# Patient Record
Sex: MALE
[De-identification: names, ages, dates, MRNs, and addresses within clinical notes are randomized per-mention and may not be internally consistent; named-entity substitution may affect disease eponyms.]

---

## 2018-04-17 ENCOUNTER — RX ONLY (OUTPATIENT)
Age: 58
Setting detail: RX ONLY
End: 2018-04-17

## 2020-02-26 ENCOUNTER — RX ONLY (OUTPATIENT)
Age: 60
Setting detail: RX ONLY
End: 2020-02-26

## 2020-12-22 ENCOUNTER — RX ONLY (OUTPATIENT)
Age: 60
Setting detail: RX ONLY
End: 2020-12-22

## 2020-12-22 ENCOUNTER — APPOINTMENT (RX ONLY)
Dept: URBAN - METROPOLITAN AREA CLINIC 56 | Facility: CLINIC | Age: 60
Setting detail: DERMATOLOGY
End: 2020-12-22

## 2020-12-22 PROBLEM — L44.9 PAPULOSQUAMOUS DISORDER, UNSPECIFIED: Status: ACTIVE | Noted: 2020-12-22

## 2020-12-22 PROCEDURE — ? RECOMMENDATIONS

## 2020-12-22 PROCEDURE — 99213 OFFICE O/P EST LOW 20 MIN: CPT

## 2020-12-22 RX ORDER — FLUOCINONIDE 0.5 MG/G
OINTMENT TOPICAL
Qty: 1 | Refills: 1 | Status: ERX

## 2020-12-22 NOTE — HPI: OTHER
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
Please Describe Your Condition:: Patient returns for his 3 month follow-up.  He has been using Vaseline on his hands and feet for the hyperkeratosis and fissuring overnight with cotton glove and sock occlusion.  He feels that has been helpful.  He has been using 40% urea cream to the hands and feet twice a day and feels that has helped some as well.  Previous treatment with calcipotriene 0.005% cream was ineffective.  He has not used any fluocinonide ointment for some time for the eruption on the lower legs.  He has just been applying Vaseline.  In general, he feels he has improved.  He still gets some fissuring of the heels, fingertips, and palms but this is much improved.  He has noticed that exposures to gasoline etc. do exacerbate his hand issue.  He tries to wear thin cotton gloves followed by protective vinyl/nitrile gloves as much as possible.

## 2020-12-22 NOTE — PROCEDURE: RECOMMENDATIONS
Recommendation Preamble: The following recommendations were made during the visit:
Detail Level: Zone
Recommendations (Free Text): Use a moisturizer such as Aquaphilic ointment, Cetaphil cream, or CeraVe cream to skin after all baths or showers and at least daily\\n\\nVaseline to hands and feet daily at bedtime with cotton glove/sock occlusion\\n\\n40% urea cream to hands and feet twice a day\\n\\nFluocinonide 0.05% ointment twice a day up to 15 days per month (avoid face and body folds) to eruption on body and extremities\\n\\nIf the problem worsens, consider referral to [facility] for phototherapy or possible treatment with Soriatane

## 2021-03-11 ENCOUNTER — APPOINTMENT (RX ONLY)
Dept: URBAN - METROPOLITAN AREA CLINIC 56 | Facility: CLINIC | Age: 61
Setting detail: DERMATOLOGY
End: 2021-03-11

## 2021-03-11 DIAGNOSIS — L44.0 PITYRIASIS RUBRA PILARIS: ICD-10-CM | Status: WORSENING

## 2021-03-11 PROBLEM — L30.9 DERMATITIS, UNSPECIFIED: Status: ACTIVE | Noted: 2021-03-11

## 2021-03-11 PROCEDURE — ? ORDER TESTS

## 2021-03-11 PROCEDURE — ? TREATMENT REGIMEN

## 2021-03-11 PROCEDURE — ? PRESCRIPTION

## 2021-03-11 PROCEDURE — ? COUNSELING

## 2021-03-11 PROCEDURE — 99214 OFFICE O/P EST MOD 30 MIN: CPT

## 2021-03-11 RX ORDER — ACITRETIN 10 MG/1
CAPSULE ORAL
Qty: 30 | Refills: 5 | Status: ERX

## 2021-03-11 NOTE — HPI: OTHER
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
Please Describe Your Condition:: Patient returns as a work in.  The skin disease has worsened on his hands and feet.  He is getting more fissures which are painful.  He has been using a moisturizing cream rather than Vaseline (because it is too messy) on his hands and feet with cotton glove and sock occlusion overnight.  In addition he has been using 40% urea cream twice a day as tolerated.  This just does not seem to be working as well as it had previously and his hands and feet becoming more uncomfortable.  It hurts when he walks on his feet.  It hurts when he uses hands to handle equipment, tools etc.  Calcipotriene cream and fluocinonide ointment were ineffective.  He wonders what else we can try.

## 2021-03-11 NOTE — PROCEDURE: TREATMENT REGIMEN
Plan: \\n1.  Because his hands and feet are getting much worse and are affecting his daily life secondary to the deep fissuring, more aggressive treatment is warranted.  He has failed calcipotriene cream and fluocinonide ointment.  With this in mind, I think acitretin is his next best option.  Potential side effects of this medication discussed.  All questions answered.\\n2.  Begin Acitretin 10 mg by mouth daily.\\n3.  Continue Eucerin cream to hands and feet daily at bedtime with sock and glove occlusion overnight.\\n4.  Continue 40% urea cream twice a day to hands and feet.\\n5.  Aquaphor to lips as often as needed.\\n6.  Saline nasal spray as needed.\\n7.  Refresh eyedrops as needed.
Detail Level: Simple

## 2021-04-08 ENCOUNTER — APPOINTMENT (RX ONLY)
Dept: URBAN - METROPOLITAN AREA CLINIC 56 | Facility: CLINIC | Age: 61
Setting detail: DERMATOLOGY
End: 2021-04-08

## 2021-04-08 DIAGNOSIS — L30.9 DERMATITIS, UNSPECIFIED: ICD-10-CM | Status: IMPROVED

## 2021-04-08 DIAGNOSIS — Z79.899 OTHER LONG TERM (CURRENT) DRUG THERAPY: ICD-10-CM

## 2021-04-08 PROCEDURE — ? ORDER TESTS

## 2021-04-08 PROCEDURE — 99214 OFFICE O/P EST MOD 30 MIN: CPT

## 2021-04-08 PROCEDURE — ? PRESCRIPTION MEDICATION MANAGEMENT

## 2021-04-08 NOTE — PROCEDURE: PRESCRIPTION MEDICATION MANAGEMENT
Plan: \\n1.  Continue Acetretin 10 mg by mouth daily.\\n2.  Continue Eucerin cream to hands and feet several times a day.  May use cotton glove and cotton sock occlusion overnight.\\n3.  Attempt to use 40% urea cream daily to hands and feet.  If this is too drying, try every other day.  If it is still too drying, discontinue the medication.\\n4.  Continue Aquaphor to lips as often as needed.\\n5.  Continue saline nasal spray as needed.\\n6.  Continue refresh eyedrops as needed.
Detail Level: Simple
Render In Strict Bullet Format?: No

## 2021-05-04 ENCOUNTER — RX ONLY (OUTPATIENT)
Age: 61
Setting detail: RX ONLY
End: 2021-05-04

## 2021-05-04 RX ORDER — UREA 40 %
CREAM (GRAM) TOPICAL
Qty: 1 | Refills: 5 | Status: CANCELLED
Stop reason: CLARIF

## 2021-05-06 ENCOUNTER — APPOINTMENT (RX ONLY)
Dept: URBAN - METROPOLITAN AREA CLINIC 56 | Facility: CLINIC | Age: 61
Setting detail: DERMATOLOGY
End: 2021-05-06

## 2021-05-06 DIAGNOSIS — Z71.89 OTHER SPECIFIED COUNSELING: ICD-10-CM

## 2021-05-06 DIAGNOSIS — L44.0 PITYRIASIS RUBRA PILARIS: ICD-10-CM | Status: IMPROVED

## 2021-05-06 PROBLEM — L30.9 DERMATITIS, UNSPECIFIED: Status: ACTIVE | Noted: 2021-05-06

## 2021-05-06 PROCEDURE — ? PRESCRIPTION MEDICATION MANAGEMENT

## 2021-05-06 PROCEDURE — 99214 OFFICE O/P EST MOD 30 MIN: CPT

## 2021-05-06 PROCEDURE — ? SUNSCREEN RECOMMENDATIONS

## 2021-05-06 NOTE — PROCEDURE: PRESCRIPTION MEDICATION MANAGEMENT
Detail Level: Zone
Plan: \\n1.  Continue Acetretin 10 mg by mouth daily.\\n2.  Continue Eucerin cream to hands and feet several times a day.  May use cotton glove and cotton sock occlusion overnight as well.\\n3.  Continue 40% urea cream to hands and feet daily.\\n4.  Continue Aquaphor to lips as often as needed. \\n5.  Continue saline nasal spray as needed.\\n6.  Continue refresh eyedrops as needed.\\n7.  Continue dietary measures as discussed previously to try and keep his triglyceride level down.  In addition continue rosuvastatin as prescribed by Dr. Connolly.  Patient states he has another follow-up appointment with him next month.\\n8.  Follow-up in 2 months.  I do plan on getting repeat laboratory work at that time if Dr. Connolly has not.
Render In Strict Bullet Format?: No

## 2021-06-29 ENCOUNTER — APPOINTMENT (RX ONLY)
Dept: URBAN - METROPOLITAN AREA CLINIC 56 | Facility: CLINIC | Age: 61
Setting detail: DERMATOLOGY
End: 2021-06-29

## 2021-06-29 DIAGNOSIS — L30.9 DERMATITIS, UNSPECIFIED: ICD-10-CM | Status: IMPROVED

## 2021-06-29 PROCEDURE — 99213 OFFICE O/P EST LOW 20 MIN: CPT

## 2021-06-29 PROCEDURE — ? PRESCRIPTION MEDICATION MANAGEMENT

## 2021-06-29 NOTE — PROCEDURE: PRESCRIPTION MEDICATION MANAGEMENT
Render In Strict Bullet Format?: No
Detail Level: Zone
Plan: \\n1.  Continue Acetretin 10 mg by mouth daily.\\n2.  Continue Eucerin cream to hands and feet several times a day.  May use cotton glove and cotton sock occlusion overnight as well.\\n3.  Continue 40% urea cream to hands and feet daily.\\n4.  Continue Aquaphor, saline nasal spray, refresh eyedrops as before.\\n5.  Patient will have a copy of his most recent laboratory work forwarded to me.\\n6.  Follow-up in 2 months.

## 2021-08-24 ENCOUNTER — APPOINTMENT (RX ONLY)
Dept: URBAN - METROPOLITAN AREA CLINIC 56 | Facility: CLINIC | Age: 61
Setting detail: DERMATOLOGY
End: 2021-08-24

## 2021-08-24 DIAGNOSIS — L44.0 PITYRIASIS RUBRA PILARIS: ICD-10-CM | Status: WORSENING

## 2021-08-24 PROBLEM — L30.9 DERMATITIS, UNSPECIFIED: Status: ACTIVE | Noted: 2021-08-24

## 2021-08-24 PROCEDURE — ? PRESCRIPTION SAMPLES PROVIDED

## 2021-08-24 PROCEDURE — 99213 OFFICE O/P EST LOW 20 MIN: CPT

## 2021-08-24 PROCEDURE — ? TREATMENT REGIMEN

## 2021-08-24 NOTE — PROCEDURE: TREATMENT REGIMEN
Detail Level: Simple
Plan: \\n1.  Continue Acetretin 10 mg by mouth daily.\\n2.  Discontinue 40% urea cream.  Patient actually feels this medication makes his hands and feet worse.\\n3.  Continue Eucerin creams to hands and feet several times a day.\\n4.  Begin Duobrii to affected areas on hands and feet daily at bedtime when necessary.\\n5.  Encourage patient to try and protect his hands appropriately with thin cotton gloves first followed by protective glove.  He may not be able to do this because he cannot perform precision work with his hands then.\\n6.  Follow-up in 2 months.  Plan repeat laboratory work at that time if patient has not gotten any laboratory work in the interim.

## 2021-10-26 ENCOUNTER — APPOINTMENT (RX ONLY)
Dept: URBAN - METROPOLITAN AREA CLINIC 56 | Facility: CLINIC | Age: 61
Setting detail: DERMATOLOGY
End: 2021-10-26

## 2021-10-26 ENCOUNTER — RX ONLY (OUTPATIENT)
Age: 61
Setting detail: RX ONLY
End: 2021-10-26

## 2021-10-26 DIAGNOSIS — L30.9 DERMATITIS, UNSPECIFIED: ICD-10-CM | Status: IMPROVED

## 2021-10-26 PROCEDURE — ? ORDER TESTS

## 2021-10-26 PROCEDURE — 99213 OFFICE O/P EST LOW 20 MIN: CPT

## 2021-10-26 PROCEDURE — ? TREATMENT REGIMEN

## 2021-10-26 RX ORDER — HALOBETASOL PROPIONATE AND TAZAROTENE .1; .45 MG/G; MG/G
LOTION TOPICAL
Qty: 100 | Refills: 1 | Status: ERX

## 2021-10-26 NOTE — PROCEDURE: TREATMENT REGIMEN
Plan: \\n1.  Continue Acitretin 10 mg by mouth daily.\\n2.  Continue Duobrii to affected areas on hands and feet daily at bedtime when necessary.\\n3.  Continue Eucerin creams to hands and feet several times daily.\\n4.  Continue thin cotton gloves followed by protective gloves as often as possible while performing work.  He does state this is very difficult because he cannot do precision work that is needed.\\n5.  Fasting lipid panel.  I will contact the patient only if there is any concerning values.  Dr. Connolly is following this.  I will have a copy of the lab results forwarded to him as well.
Detail Level: Simple

## 2022-02-15 ENCOUNTER — APPOINTMENT (RX ONLY)
Dept: URBAN - METROPOLITAN AREA CLINIC 56 | Facility: CLINIC | Age: 62
Setting detail: DERMATOLOGY
End: 2022-02-15

## 2022-02-15 DIAGNOSIS — L30.9 DERMATITIS, UNSPECIFIED: ICD-10-CM | Status: STABLE

## 2022-02-15 PROCEDURE — ? TREATMENT REGIMEN

## 2022-02-15 PROCEDURE — 99213 OFFICE O/P EST LOW 20 MIN: CPT

## 2022-02-15 NOTE — PROCEDURE: TREATMENT REGIMEN
Detail Level: Detailed
Plan: \\n1.  I encouraged patient to phone Dr Connolly's office again today for consideration of fenofibrate (or some other medication) to lower his triglyceride level.  I think his hands and feet will get much worse if we have to discontinue the acetretin.  I asked him to call me after he speaks to them today.\\n2.  If his physician decides to start him on another medication to lower his triglyceride level, I will have him continue acetretin 10 mg p.o. daily, Duobrii to affected areas on hands and feet nightly as needed, Eucerin cream to hands and feet as often as possible but at least several times a day, and appropriate hand protection with thin cotton gauze followed by protective gloves when working.  If his physician decides not to start him on another medication to lower his triglyceride level, I instructed patient to discontinue the acetretin and phone me.  I would probably need to refer him to the [facility] for hand and foot phototherapy.\\n3.  Follow-up in 4 months, sooner if needed.

## 2022-06-08 ENCOUNTER — APPOINTMENT (RX ONLY)
Dept: URBAN - METROPOLITAN AREA CLINIC 56 | Facility: CLINIC | Age: 62
Setting detail: DERMATOLOGY
End: 2022-06-08

## 2022-06-08 DIAGNOSIS — L30.9 DERMATITIS, UNSPECIFIED: ICD-10-CM | Status: WELL CONTROLLED

## 2022-06-08 PROCEDURE — 99213 OFFICE O/P EST LOW 20 MIN: CPT

## 2022-06-08 PROCEDURE — ? TREATMENT REGIMEN

## 2022-06-08 PROCEDURE — ? PRESCRIPTION

## 2022-06-08 RX ORDER — HALOBETASOL PROPIONATE AND TAZAROTENE .1; .45 MG/G; MG/G
LOTION TOPICAL
Qty: 100 | Refills: 5 | Status: ERX | COMMUNITY
Start: 2022-06-08

## 2022-06-08 RX ADMIN — HALOBETASOL PROPIONATE AND TAZAROTENE: .1; .45 LOTION TOPICAL at 00:00

## 2022-06-08 NOTE — PROCEDURE: TREATMENT REGIMEN
Detail Level: Simple
Plan: \\n1.  Continue acitretin 10 mg p.o. daily.\\n2.  Continue Duobrii daily as needed flaring of the dermatitis on the hands and feet.\\n3.  Continue Eucerin cream to hands and feet several times daily\\n4.  Continue hand protection as before.\\n5.  Continue fenofibrate at as per his internist for the hypertriglyceridemia

## 2022-09-28 ENCOUNTER — APPOINTMENT (RX ONLY)
Dept: URBAN - METROPOLITAN AREA CLINIC 56 | Facility: CLINIC | Age: 62
Setting detail: DERMATOLOGY
End: 2022-09-28

## 2022-09-28 DIAGNOSIS — L30.9 DERMATITIS, UNSPECIFIED: ICD-10-CM | Status: WELL CONTROLLED

## 2022-09-28 PROCEDURE — ? TREATMENT REGIMEN

## 2022-09-28 PROCEDURE — 99213 OFFICE O/P EST LOW 20 MIN: CPT

## 2022-09-28 NOTE — PROCEDURE: TREATMENT REGIMEN
Detail Level: Zone
Plan: \\n1.  Continue acitretin 10 mg p.o. daily.\\n2.  Encouraged patient to use Duobrii daily as needed flaring of the dermatitis on his hands and feet.\\n3.  Encouraged patient to use Eucerin cream to his hands and feet several times per day regularly.\\n4.  Continue hand protection as before.\\n5.  Continue fenofibrate as per his internist for the hypertriglyceridemia.\\n6.  Because patient had laboratory work done within the past month that is internist, I will not repeat that today.  I will have my  contact patient's primary care physician's office to get a copy of his most recent laboratory work.  If there are any concerning values, I will contact the patient.

## 2022-09-29 ENCOUNTER — APPOINTMENT (RX ONLY)
Dept: URBAN - METROPOLITAN AREA CLINIC 56 | Facility: CLINIC | Age: 62
Setting detail: DERMATOLOGY
End: 2022-09-29

## 2022-09-29 DIAGNOSIS — E78.1 PURE HYPERGLYCERIDEMIA: ICD-10-CM

## 2022-09-29 PROCEDURE — ? ORDER TESTS

## 2022-09-29 NOTE — PROCEDURE: ORDER TESTS
Performing Laboratory: 0
Bill For Surgical Tray: no
Billing Type: Third-Party Bill
Expected Date Of Service: 09/29/2022

## 2023-03-29 ENCOUNTER — APPOINTMENT (RX ONLY)
Dept: URBAN - METROPOLITAN AREA CLINIC 56 | Facility: CLINIC | Age: 63
Setting detail: DERMATOLOGY
End: 2023-03-29

## 2023-03-29 DIAGNOSIS — L29.89 OTHER PRURITUS: ICD-10-CM | Status: INADEQUATELY CONTROLLED

## 2023-03-29 DIAGNOSIS — L30.9 DERMATITIS, UNSPECIFIED: ICD-10-CM | Status: INADEQUATELY CONTROLLED

## 2023-03-29 DIAGNOSIS — L29.8 OTHER PRURITUS: ICD-10-CM | Status: INADEQUATELY CONTROLLED

## 2023-03-29 DIAGNOSIS — Z71.89 OTHER SPECIFIED COUNSELING: ICD-10-CM

## 2023-03-29 PROCEDURE — ? TREATMENT REGIMEN

## 2023-03-29 PROCEDURE — 99214 OFFICE O/P EST MOD 30 MIN: CPT

## 2023-03-29 PROCEDURE — ? SUNSCREEN RECOMMENDATIONS

## 2023-03-29 PROCEDURE — ? ORDER TESTS

## 2023-03-29 ASSESSMENT — LOCATION ZONE DERM: LOCATION ZONE: TRUNK

## 2023-03-29 ASSESSMENT — LOCATION DETAILED DESCRIPTION DERM: LOCATION DETAILED: RIGHT SUPERIOR MEDIAL UPPER BACK

## 2023-03-29 ASSESSMENT — LOCATION SIMPLE DESCRIPTION DERM: LOCATION SIMPLE: RIGHT UPPER BACK

## 2023-03-29 NOTE — PROCEDURE: TREATMENT REGIMEN
Plan: 1.  Continue acitretin 10 mg p.o. daily.\\n2.  Hepatic function panel, creatinine, fasting lipid panel.  I will call the patient with the results.\\n3.  Discontinue Duobrii.\\n4.  Begin Vtama to affected areas on hands, feet, and legs daily.  Samples given.\\n5.  Continue Eucerin cream to hands and feet at least daily and continue hand protection as before.\\n6.  Continue fenofibrate as per his internist for the hypertriglyceridemia.
Detail Level: Detailed
Plan: 1.  Take cooler baths and showers.\\n2.  Moisturize skin after all baths and showers.  Samples of Eucerin lotion given as well as Neutrogena hydrogel cream.\\n3.  Sarna lotion as often as needed.\\n4.  Because this itching seems to have started around the time he started amlodipine, I suggested he take a 2-week holiday off the amlodipine.  If the pruritus resolves, patient will call his internist to look into an alternative medication for the amlodipine.  If he notes no change in the pruritus after 2 weeks, he may restart the amlodipine.
Detail Level: Zone

## 2023-03-29 NOTE — PROCEDURE: ORDER TESTS
Expected Date Of Service: 03/29/2023
Performing Laboratory: 0
Bill For Surgical Tray: no
Billing Type: Third-Party Bill

## 2023-05-18 ENCOUNTER — RX ONLY (OUTPATIENT)
Age: 63
Setting detail: RX ONLY
End: 2023-05-18

## 2023-05-18 RX ORDER — TAPINAROF 10 MG/1000MG
CREAM TOPICAL
Qty: 60 | Refills: 5 | Status: ERX | COMMUNITY
Start: 2023-05-18

## 2023-11-21 ENCOUNTER — APPOINTMENT (RX ONLY)
Dept: URBAN - METROPOLITAN AREA CLINIC 56 | Facility: CLINIC | Age: 63
Setting detail: DERMATOLOGY
End: 2023-11-21

## 2023-11-21 DIAGNOSIS — L57.0 ACTINIC KERATOSIS: ICD-10-CM

## 2023-11-21 DIAGNOSIS — L30.9 DERMATITIS, UNSPECIFIED: ICD-10-CM | Status: WELL CONTROLLED

## 2023-11-21 DIAGNOSIS — B35.3 TINEA PEDIS: ICD-10-CM

## 2023-11-21 DIAGNOSIS — Z71.89 OTHER SPECIFIED COUNSELING: ICD-10-CM

## 2023-11-21 DIAGNOSIS — L29.8 OTHER PRURITUS: ICD-10-CM

## 2023-11-21 DIAGNOSIS — L29.89 OTHER PRURITUS: ICD-10-CM

## 2023-11-21 PROCEDURE — ? SUNSCREEN RECOMMENDATIONS

## 2023-11-21 PROCEDURE — ? TREATMENT REGIMEN

## 2023-11-21 PROCEDURE — ? ORDER TESTS

## 2023-11-21 PROCEDURE — ? LIQUID NITROGEN

## 2023-11-21 PROCEDURE — 17000 DESTRUCT PREMALG LESION: CPT

## 2023-11-21 PROCEDURE — 99213 OFFICE O/P EST LOW 20 MIN: CPT | Mod: 25

## 2023-11-21 ASSESSMENT — LOCATION SIMPLE DESCRIPTION DERM: LOCATION SIMPLE: RIGHT CHEEK

## 2023-11-21 ASSESSMENT — LOCATION DETAILED DESCRIPTION DERM: LOCATION DETAILED: RIGHT SUPERIOR LATERAL MALAR CHEEK

## 2023-11-21 ASSESSMENT — LOCATION ZONE DERM: LOCATION ZONE: FACE

## 2023-11-21 ASSESSMENT — ITCH NUMERIC RATING SCALE: HOW SEVERE IS YOUR ITCHING?: 2

## 2023-11-21 NOTE — PROCEDURE: LIQUID NITROGEN
Application Tool (Optional): Cotton Tipped Applicator
Render Note In Bullet Format When Appropriate: No
Post-Care Instructions: I reviewed with the patient in detail post-care instructions. Patient is to wear sunprotection, and avoid picking at any of the treated lesions. Pt may apply Vaseline to crusted or scabbing areas.
Render Post-Care Instructions In Note?: yes
Duration Of Freeze Thaw-Cycle (Seconds): 10
Consent: The patient's consent was obtained including but not limited to risks of crusting, scabbing, blistering, scarring, darker or lighter pigmentary change, recurrence, incomplete removal and infection.
Detail Level: Detailed
Number Of Freeze-Thaw Cycles: 1 freeze-thaw cycle

## 2023-11-21 NOTE — PROCEDURE: TREATMENT REGIMEN
Plan: 1.  Hepatic function panel and fasting lipid panel\\n2.   Continue acitretin 10 mg p.o. daily.\\n3.  Continue Vtama to affected areas daily.\\n4.  Continue Eucerin cream to hands and feet several times daily and continue hand protection as before.\\n5.  Continue fenofibrate as per his internist for his hypertriglyceridemia
Detail Level: Zone
Plan: Because patient had no pruritus at all over the summertime and fall, this obviously is not a drug reaction to his fenofibrate.  All his oral medications are unchanged and the doses have not changed.  With this in mind, it sounds like this is more \"winter itch secondary to xerosis.  I encouraged the patient to take cooler showers and moisturize his entire skin after all baths and showers, at least daily, and more often if needed.  He may also use a mentholated lotion as needed.
Plan: 1.  Lamisil cream twice daily for 1 month.  Repeat as needed.\\n2.  Zeasorb-AF powder to feet daily long-term

## 2023-11-21 NOTE — PROCEDURE: MIPS QUALITY
Quality 431: Preventive Care And Screening: Unhealthy Alcohol Use - Screening: Patient not identified as an unhealthy alcohol user when screened for unhealthy alcohol use using a systematic screening method
Quality 486: Dermatitis - Improvement In Patient-Reported Itch Severity: Itch severity assessment score is reduced by 2 or more points from the initial (index) assessment score to the follow-up visit score
Detail Level: Detailed
Quality 226: Preventive Care And Screening: Tobacco Use: Screening And Cessation Intervention: Patient screened for tobacco use and is an ex/non-smoker

## 2023-11-21 NOTE — PROCEDURE: ORDER TESTS
Bill For Surgical Tray: no
Performing Laboratory: 0
Billing Type: Third-Party Bill
Expected Date Of Service: 11/21/2023

## 2023-11-28 ENCOUNTER — RX ONLY (OUTPATIENT)
Age: 63
Setting detail: RX ONLY
End: 2023-11-28

## 2023-11-28 RX ORDER — ACITRETIN 10 MG/1
CAPSULE ORAL
Qty: 30 | Refills: 5 | Status: ERX

## 2024-05-09 ENCOUNTER — APPOINTMENT (RX ONLY)
Dept: URBAN - METROPOLITAN AREA CLINIC 56 | Facility: CLINIC | Age: 64
Setting detail: DERMATOLOGY
End: 2024-05-09

## 2024-05-09 DIAGNOSIS — L57.0 ACTINIC KERATOSIS: ICD-10-CM

## 2024-05-09 DIAGNOSIS — Z71.89 OTHER SPECIFIED COUNSELING: ICD-10-CM

## 2024-05-09 DIAGNOSIS — L30.9 DERMATITIS, UNSPECIFIED: ICD-10-CM | Status: STABLE

## 2024-05-09 PROCEDURE — ? TREATMENT REGIMEN

## 2024-05-09 PROCEDURE — ? ORDER TESTS

## 2024-05-09 PROCEDURE — ? SUNSCREEN RECOMMENDATIONS

## 2024-05-09 PROCEDURE — 99213 OFFICE O/P EST LOW 20 MIN: CPT | Mod: 25

## 2024-05-09 PROCEDURE — ? LIQUID NITROGEN

## 2024-05-09 PROCEDURE — ? COUNSELING

## 2024-05-09 PROCEDURE — 17000 DESTRUCT PREMALG LESION: CPT

## 2024-05-09 ASSESSMENT — LOCATION ZONE DERM: LOCATION ZONE: NOSE

## 2024-05-09 ASSESSMENT — ITCH NUMERIC RATING SCALE: HOW SEVERE IS YOUR ITCHING?: 0

## 2024-05-09 ASSESSMENT — LOCATION DETAILED DESCRIPTION DERM: LOCATION DETAILED: LEFT NASAL SIDEWALL

## 2024-05-09 ASSESSMENT — LOCATION SIMPLE DESCRIPTION DERM: LOCATION SIMPLE: LEFT NOSE

## 2024-05-09 NOTE — PROCEDURE: TREATMENT REGIMEN
Detail Level: Detailed
Plan: 1.  Continue acitretin 10 mg p.o. daily.\\n2.   Continue VTama daily to affected areas.\\n3.  Continue Eucerin cream to hands and feet at least daily and more often if needed.  Continue hand protection as before as well.\\n4.  Continue fenofibrate as per his internist for his hypertriglyceridemia\\n5.  Hepatic function panel and fasting lipid panel.  I will call the patient with the results.

## 2024-05-09 NOTE — PROCEDURE: LIQUID NITROGEN
Application Tool (Optional): Cotton Tipped Applicator
Duration Of Freeze Thaw-Cycle (Seconds): 10
Show Aperture Variable?: Yes
Post-Care Instructions: I reviewed with the patient in detail post-care instructions. Patient is to wear sunprotection, and avoid picking at any of the treated lesions. Pt may apply Vaseline to crusted or scabbing areas.
Consent: The patient's consent was obtained including but not limited to risks of crusting, scabbing, blistering, scarring, darker or lighter pigmentary change, recurrence, incomplete removal and infection.
Detail Level: Detailed
Render Note In Bullet Format When Appropriate: No
Number Of Freeze-Thaw Cycles: 1 freeze-thaw cycle

## 2024-05-09 NOTE — PROCEDURE: ORDER TESTS
Billing Type: Third-Party Bill
Performing Laboratory: 0
Bill For Surgical Tray: no
Expected Date Of Service: 05/09/2024

## 2024-05-09 NOTE — PROCEDURE: MIPS QUALITY
Quality 486: Dermatitis - Improvement In Patient-Reported Itch Severity: Itch severity assessment score is reduced by 3 or more points from the initial (index) assessment score to the follow-up visit score
Quality 226: Preventive Care And Screening: Tobacco Use: Screening And Cessation Intervention: Patient screened for tobacco use and is an ex/non-smoker
Detail Level: Detailed
Quality 130: Documentation Of Current Medications In The Medical Record: Current Medications Documented

## 2024-11-13 ENCOUNTER — APPOINTMENT (RX ONLY)
Dept: URBAN - METROPOLITAN AREA CLINIC 56 | Facility: CLINIC | Age: 64
Setting detail: DERMATOLOGY
End: 2024-11-13

## 2024-11-13 DIAGNOSIS — L30.9 DERMATITIS, UNSPECIFIED: ICD-10-CM | Status: WELL CONTROLLED

## 2024-11-13 DIAGNOSIS — Z71.89 OTHER SPECIFIED COUNSELING: ICD-10-CM

## 2024-11-13 PROCEDURE — ? TREATMENT REGIMEN

## 2024-11-13 PROCEDURE — ? SUNSCREEN RECOMMENDATIONS

## 2024-11-13 PROCEDURE — 99213 OFFICE O/P EST LOW 20 MIN: CPT

## 2024-11-13 NOTE — PROCEDURE: TREATMENT REGIMEN
Detail Level: Detailed
Plan: 1.  Continue acitretin 10 mg p.o. daily.\\n2.  Encouraged patient to use VTama daily to affected areas regularly.\\n3.  Encouraged patient to use Eucerin cream to hands and feet at least daily and more often if needed.  Continue hand protection as before as well.\\n4.  Continue fenofibrate as per his internist for his hypertriglyceridemia\\n5.  I will have my medical assistant phone Dr. Maradiaga's office to obtain his laboratory work that has been performed this fall

## 2024-11-13 NOTE — PROCEDURE: MIPS QUALITY
Quality 486: Dermatitis - Improvement In Patient-Reported Itch Severity: Itch severity assessment score is reduced by 3 or more points from the initial (index) assessment score to the follow-up visit score
Quality 226: Preventive Care And Screening: Tobacco Use: Screening And Cessation Intervention: Patient screened for tobacco use and is an ex/non-smoker
Detail Level: Detailed
Quality 130: Documentation Of Current Medications In The Medical Record: Current Medications Documented
no

## 2025-01-08 ENCOUNTER — RX ONLY (RX ONLY)
Age: 65
End: 2025-01-08

## 2025-01-08 RX ORDER — TAPINAROF 10 MG/1000MG
CREAM TOPICAL
Qty: 60 | Refills: 5 | Status: ERX

## 2025-08-13 ENCOUNTER — RX ONLY (RX ONLY)
Age: 65
End: 2025-08-13

## 2025-08-13 RX ORDER — ACITRETIN 10 MG/1
CAPSULE ORAL
Qty: 30 | Refills: 0 | Status: ERX